# Patient Record
Sex: MALE | URBAN - METROPOLITAN AREA
[De-identification: names, ages, dates, MRNs, and addresses within clinical notes are randomized per-mention and may not be internally consistent; named-entity substitution may affect disease eponyms.]

---

## 2024-04-16 ENCOUNTER — OV NP (OUTPATIENT)
Dept: URBAN - METROPOLITAN AREA CLINIC 105 | Facility: CLINIC | Age: 49
End: 2024-04-16
Payer: COMMERCIAL

## 2024-04-16 VITALS
HEART RATE: 62 BPM | BODY MASS INDEX: 25.56 KG/M2 | SYSTOLIC BLOOD PRESSURE: 129 MMHG | WEIGHT: 153.4 LBS | TEMPERATURE: 98.2 F | HEIGHT: 65 IN | DIASTOLIC BLOOD PRESSURE: 75 MMHG

## 2024-04-16 DIAGNOSIS — R74.8 ABNORMAL ALKALINE PHOSPHATASE TEST: ICD-10-CM

## 2024-04-16 PROBLEM — 86406008: Status: ACTIVE | Noted: 2024-04-16

## 2024-04-16 PROBLEM — 235866006: Status: ACTIVE | Noted: 2024-04-16

## 2024-04-16 PROCEDURE — 99243 OFF/OP CNSLTJ NEW/EST LOW 30: CPT | Performed by: INTERNAL MEDICINE

## 2024-04-16 PROCEDURE — 99203 OFFICE O/P NEW LOW 30 MIN: CPT | Performed by: INTERNAL MEDICINE

## 2024-04-16 RX ORDER — DEXTROAMPHETAMINE SACCHARATE, AMPHETAMINE ASPARTATE, DEXTROAMPHETAMINE SULFATE, AND AMPHETAMINE SULFATE 7.5; 7.5; 7.5; 7.5 MG/1; MG/1; MG/1; MG/1
1 TABLET TABLET ORAL TWICE A DAY
Status: ACTIVE | COMMUNITY

## 2024-04-16 RX ORDER — ESZOPICLONE 2 MG/1
1 TABLET IMMEDIATELY BEFORE BEDTIME TABLET, COATED ORAL ONCE A DAY
Status: ACTIVE | COMMUNITY

## 2024-04-16 RX ORDER — VALACYCLOVIR 1 G/1
TAKE 2 TABLETS TWICE DAILY FOR 1 DAY ONLY ORALLY AS NEEDED FOR OUTBREAKS TABLET, FILM COATED ORAL
Qty: 28 EACH | Refills: 0 | Status: ACTIVE | COMMUNITY

## 2024-04-16 RX ORDER — CHOLECALCIFEROL (VITAMIN D3) 50 MCG
1 TABLET TABLET ORAL ONCE A DAY
Status: ACTIVE | COMMUNITY

## 2024-04-16 RX ORDER — VERAPAMIL HYDROCHLORIDE 360 MG/1
TAKE 1 CAPSULE BY MOUTH ONCE DAILY CAPSULE, DELAYED RELEASE PELLETS ORAL
Qty: 90 EACH | Refills: 9 | Status: ACTIVE | COMMUNITY

## 2024-04-16 RX ORDER — DOLUTEGRAVIR SODIUM AND LAMIVUDINE 50; 300 MG/1; MG/1
TABLET, FILM COATED ORAL
Qty: 90 TABLET | Status: ACTIVE | COMMUNITY

## 2024-04-16 NOTE — HPI-TODAY'S VISIT:
Pt comeson referral from Dr. Anup Caro. A copy will be sent to the referring MD. pt says that about 6 weeks ago he was on a business trip in [place]. he was having abdominal pain and cramping.no diarrhea. pt had labs and CT that was a + hep C antibody. -   Laboratory dated April 2, 2024 shows AST and ALT of 2400 in  respectively.  Ceruloplasmin was normal at 35.2 alpha 1 antitrypsin was normal.  Alk-phos is 217.  Quantitative hepatitis-C viral load DNA PCR is 30,000 genotype 1A.

## 2024-04-21 LAB
ALBUMIN/GLOBULIN RATIO: 1.6
ALBUMIN: 4.4
ALKALINE PHOSPHATASE: 88
ALT (SGPT): 76
AST (SGOT): 26
BILIRUBIN, DIRECT: 0.3
BILIRUBIN, INDIRECT: 0.5
BILIRUBIN, TOTAL: 0.8
GLOBULIN: 2.7
HEPATITIS B CORE AB TOTAL: REACTIVE
HEPATITIS B SURFACE AB IMMUNITY, QN: 116
HEPATITIS B SURFACE ANTIGEN: (no result)
HEPATITIS C VIRAL RNA: NOT DETECTED
PROTEIN, TOTAL: 7.1

## 2024-06-18 ENCOUNTER — DASHBOARD ENCOUNTERS (OUTPATIENT)
Age: 49
End: 2024-06-18

## 2024-06-18 ENCOUNTER — OFFICE VISIT (OUTPATIENT)
Dept: URBAN - METROPOLITAN AREA CLINIC 105 | Facility: CLINIC | Age: 49
End: 2024-06-18
Payer: COMMERCIAL

## 2024-06-18 VITALS
SYSTOLIC BLOOD PRESSURE: 123 MMHG | BODY MASS INDEX: 25.06 KG/M2 | WEIGHT: 150.4 LBS | HEART RATE: 66 BPM | HEIGHT: 65 IN | DIASTOLIC BLOOD PRESSURE: 67 MMHG | TEMPERATURE: 97.7 F

## 2024-06-18 DIAGNOSIS — R12 HEARTBURN: ICD-10-CM

## 2024-06-18 DIAGNOSIS — E55.9 VITAMIN D DEFICIENCY: ICD-10-CM

## 2024-06-18 DIAGNOSIS — R79.89 ABNORMAL LFTS: ICD-10-CM

## 2024-06-18 DIAGNOSIS — Z86.19 HISTORY OF HEPATITIS C: ICD-10-CM

## 2024-06-18 PROBLEM — 34713006: Status: ACTIVE | Noted: 2024-06-18

## 2024-06-18 PROCEDURE — 99213 OFFICE O/P EST LOW 20 MIN: CPT | Performed by: INTERNAL MEDICINE

## 2024-06-18 RX ORDER — VALACYCLOVIR 1 G/1
TAKE 2 TABLETS TWICE DAILY FOR 1 DAY ONLY ORALLY AS NEEDED FOR OUTBREAKS TABLET, FILM COATED ORAL
Qty: 28 EACH | Refills: 0 | Status: ACTIVE | COMMUNITY

## 2024-06-18 RX ORDER — ESZOPICLONE 2 MG/1
1 TABLET IMMEDIATELY BEFORE BEDTIME TABLET, COATED ORAL ONCE A DAY
Status: ACTIVE | COMMUNITY

## 2024-06-18 RX ORDER — DEXTROAMPHETAMINE SACCHARATE, AMPHETAMINE ASPARTATE, DEXTROAMPHETAMINE SULFATE, AND AMPHETAMINE SULFATE 7.5; 7.5; 7.5; 7.5 MG/1; MG/1; MG/1; MG/1
1 TABLET TABLET ORAL TWICE A DAY
Status: ACTIVE | COMMUNITY

## 2024-06-18 RX ORDER — CHOLECALCIFEROL (VITAMIN D3) 50 MCG
1 TABLET TABLET ORAL ONCE A DAY
Status: ACTIVE | COMMUNITY

## 2024-06-18 RX ORDER — DOLUTEGRAVIR SODIUM AND LAMIVUDINE 50; 300 MG/1; MG/1
TABLET, FILM COATED ORAL
Qty: 90 TABLET | Status: ACTIVE | COMMUNITY

## 2024-06-18 RX ORDER — VERAPAMIL HYDROCHLORIDE 360 MG/1
TAKE 1 CAPSULE BY MOUTH ONCE DAILY CAPSULE, DELAYED RELEASE PELLETS ORAL
Qty: 90 EACH | Refills: 9 | Status: ACTIVE | COMMUNITY

## 2024-06-18 NOTE — HPI-TODAY'S VISIT:
Pt comeson referral from Dr. Anup Caro. A copy will be sent to the referring MD. pt says that about 6 weeks ago he was on a business trip in [facility]. he was having abdominal pain and cramping.no diarrhea. pt had labs and CT that was a + hep C antibody. -   Laboratory dated April 2, 2024 shows AST and ALT of 2400 in  respectively.  Ceruloplasmin was normal at 35.2 alpha 1 antitrypsin was normal.  Alk-phos is 217.  Quantitative hepatitis-C viral load DNA PCR is 30,000 genotype 1A. - 6/18/2024: has been having some heartburn and acid indigestion.  He has tried to take some over-the-counter medications.

## 2024-07-19 ENCOUNTER — OFFICE VISIT (OUTPATIENT)
Dept: URBAN - METROPOLITAN AREA SURGERY CENTER 16 | Facility: SURGERY CENTER | Age: 49
End: 2024-07-19
Payer: COMMERCIAL

## 2024-07-19 ENCOUNTER — CLAIMS CREATED FROM THE CLAIM WINDOW (OUTPATIENT)
Dept: URBAN - METROPOLITAN AREA CLINIC 4 | Facility: CLINIC | Age: 49
End: 2024-07-19
Payer: COMMERCIAL

## 2024-07-19 DIAGNOSIS — K21.9 ACID REFLUX: ICD-10-CM

## 2024-07-19 DIAGNOSIS — K30 DYSPEPSIA: ICD-10-CM

## 2024-07-19 DIAGNOSIS — K21.9 GASTRIC REFLUX: ICD-10-CM

## 2024-07-19 DIAGNOSIS — R12 HEARTBURN: ICD-10-CM

## 2024-07-19 DIAGNOSIS — K31.89 OTHER DISEASES OF STOMACH AND DUODENUM: ICD-10-CM

## 2024-07-19 DIAGNOSIS — K31.89 REACTIVE GASTROPATHY: ICD-10-CM

## 2024-07-19 DIAGNOSIS — K20.80 ESOPHAGITIS, LOS ANGELES GRADE A: ICD-10-CM

## 2024-07-19 DIAGNOSIS — K29.70 GASTRITIS, UNSPECIFIED, WITHOUT BLEEDING: ICD-10-CM

## 2024-07-19 DIAGNOSIS — K21.9 GASTRO-ESOPHAGEAL REFLUX DISEASE WITHOUT ESOPHAGITIS: ICD-10-CM

## 2024-07-19 PROCEDURE — 88312 SPECIAL STAINS GROUP 1: CPT | Performed by: PATHOLOGY

## 2024-07-19 PROCEDURE — 88305 TISSUE EXAM BY PATHOLOGIST: CPT | Performed by: PATHOLOGY

## 2024-07-19 PROCEDURE — 00731 ANES UPR GI NDSC PX NOS: CPT | Performed by: ANESTHESIOLOGIST ASSISTANT

## 2024-07-19 PROCEDURE — 43239 EGD BIOPSY SINGLE/MULTIPLE: CPT | Performed by: INTERNAL MEDICINE

## 2024-07-19 PROCEDURE — 00731 ANES UPR GI NDSC PX NOS: CPT | Performed by: ANESTHESIOLOGY

## 2024-07-19 RX ORDER — FAMOTIDINE 40 MG/1
1 TABLET TABLET, FILM COATED ORAL TWICE A DAY
Qty: 180 TABLET | Refills: 3 | OUTPATIENT
Start: 2024-07-19

## 2024-07-19 RX ORDER — DOLUTEGRAVIR SODIUM AND LAMIVUDINE 50; 300 MG/1; MG/1
TABLET, FILM COATED ORAL
Qty: 90 TABLET | Status: ACTIVE | COMMUNITY

## 2024-07-19 RX ORDER — VALACYCLOVIR 1 G/1
TAKE 2 TABLETS TWICE DAILY FOR 1 DAY ONLY ORALLY AS NEEDED FOR OUTBREAKS TABLET, FILM COATED ORAL
Qty: 28 EACH | Refills: 0 | Status: ACTIVE | COMMUNITY

## 2024-07-19 RX ORDER — CHOLECALCIFEROL (VITAMIN D3) 50 MCG
1 TABLET TABLET ORAL ONCE A DAY
Status: ACTIVE | COMMUNITY

## 2024-07-19 RX ORDER — VERAPAMIL HYDROCHLORIDE 360 MG/1
TAKE 1 CAPSULE BY MOUTH ONCE DAILY CAPSULE, DELAYED RELEASE PELLETS ORAL
Qty: 90 EACH | Refills: 9 | Status: ACTIVE | COMMUNITY

## 2024-07-19 RX ORDER — DEXTROAMPHETAMINE SACCHARATE, AMPHETAMINE ASPARTATE, DEXTROAMPHETAMINE SULFATE, AND AMPHETAMINE SULFATE 7.5; 7.5; 7.5; 7.5 MG/1; MG/1; MG/1; MG/1
1 TABLET TABLET ORAL TWICE A DAY
Status: ACTIVE | COMMUNITY

## 2024-07-19 RX ORDER — ESZOPICLONE 2 MG/1
1 TABLET IMMEDIATELY BEFORE BEDTIME TABLET, COATED ORAL ONCE A DAY
Status: ACTIVE | COMMUNITY